# Patient Record
Sex: MALE | Race: BLACK OR AFRICAN AMERICAN | HISPANIC OR LATINO | URBAN - METROPOLITAN AREA
[De-identification: names, ages, dates, MRNs, and addresses within clinical notes are randomized per-mention and may not be internally consistent; named-entity substitution may affect disease eponyms.]

---

## 2017-02-21 ENCOUNTER — EMERGENCY (EMERGENCY)
Facility: HOSPITAL | Age: 27
LOS: 1 days | Discharge: HOME | End: 2017-02-21
Attending: EMERGENCY MEDICINE | Admitting: EMERGENCY MEDICINE
Payer: SELF-PAY

## 2017-02-21 VITALS
RESPIRATION RATE: 16 BRPM | OXYGEN SATURATION: 97 % | TEMPERATURE: 98 F | DIASTOLIC BLOOD PRESSURE: 68 MMHG | SYSTOLIC BLOOD PRESSURE: 110 MMHG | HEART RATE: 64 BPM

## 2017-02-21 DIAGNOSIS — R05 COUGH: ICD-10-CM

## 2017-02-21 PROCEDURE — 99283 EMERGENCY DEPT VISIT LOW MDM: CPT

## 2017-02-21 NOTE — ED PROVIDER NOTE - OBJECTIVE STATEMENT
25 yo male with a history of polysubstance abuse and numerous ED visits for AMS presents by EMS.  States that he was just trying to sleep in front in the [employer] and was brought in.  States "nothing hurts, I just want something to eat and to sleep"  Complex care note was added recently as the patient has been combative with the staff in the past.  Uncooperative with history and physical.

## 2017-02-21 NOTE — ED PROVIDER NOTE - MEDICAL DECISION MAKING DETAILS
MSE performed.  Patient asking to sleep and for food.  Numerous ED visits for AMS/substance abuse in the past.  No vital sign derangements.  Will provide food and discharge from the ED

## 2017-02-22 ENCOUNTER — EMERGENCY (EMERGENCY)
Facility: HOSPITAL | Age: 27
LOS: 1 days | Discharge: HOME | End: 2017-02-22
Attending: EMERGENCY MEDICINE | Admitting: EMERGENCY MEDICINE
Payer: SELF-PAY

## 2017-02-22 VITALS
DIASTOLIC BLOOD PRESSURE: 85 MMHG | SYSTOLIC BLOOD PRESSURE: 110 MMHG | OXYGEN SATURATION: 97 % | RESPIRATION RATE: 16 BRPM | HEART RATE: 80 BPM | TEMPERATURE: 98 F

## 2017-02-22 VITALS
SYSTOLIC BLOOD PRESSURE: 105 MMHG | RESPIRATION RATE: 16 BRPM | OXYGEN SATURATION: 96 % | TEMPERATURE: 98 F | HEART RATE: 84 BPM | DIASTOLIC BLOOD PRESSURE: 89 MMHG

## 2017-02-22 DIAGNOSIS — R41.82 ALTERED MENTAL STATUS, UNSPECIFIED: ICD-10-CM

## 2017-02-22 DIAGNOSIS — F10.129 ALCOHOL ABUSE WITH INTOXICATION, UNSPECIFIED: ICD-10-CM

## 2017-02-22 PROCEDURE — 99283 EMERGENCY DEPT VISIT LOW MDM: CPT

## 2017-02-22 NOTE — ED BEHAVIORAL HEALTH NOTE - BEHAVIORAL HEALTH NOTE
Worker provided resident with information for both inpatient and outpatient detox centers. Also provided patient with information about where to get food, clothes, showers and shelter just in case he needs that current information as he was unwilling to provide to the location to where he stays or if he gets any other medical assistance.

## 2017-02-22 NOTE — ED ADULT NURSE NOTE - CHIEF COMPLAINT QUOTE
Pt found sleeping in front of a [employer]. Pt admits to drinking alcohol.  in field. Pt aggressive in triage. See complex care note.

## 2017-02-22 NOTE — SBIRT NOTE. - NSSBIRTFULLSCREEN_GEN_A_ED_FT
Meeting with patient attempted however Full Screen Not Performed due to  Severity of illness/mental status  RN deferred full screen

## 2017-02-22 NOTE — ED ADULT NURSE NOTE - PMH
ADHD (attention deficit hyperactivity disorder)    No pertinent past medical history    Polysubstance (excluding opioids) dependence    Suicidal ideation

## 2017-02-22 NOTE — ED ADULT TRIAGE NOTE - CHIEF COMPLAINT QUOTE
Pt found sleeping in front of a [employer]. Pt admits to drinking alcohol.  in field. Pt aggressive in triage. See complex care note.

## 2017-02-22 NOTE — ED PROVIDER NOTE - OBJECTIVE STATEMENT
27 y/o male pt, hx of prior visits to ED due to AMS, alcohol intox. Here for AMS. Uncooperative w hx, sleeping while being examined but responds to some commands. Hx of aggressive behavior in ED before

## 2017-03-15 ENCOUNTER — EMERGENCY (EMERGENCY)
Facility: HOSPITAL | Age: 27
LOS: 1 days | Discharge: HOME | End: 2017-03-15
Attending: EMERGENCY MEDICINE | Admitting: EMERGENCY MEDICINE
Payer: MEDICAID

## 2017-03-15 VITALS
TEMPERATURE: 98 F | DIASTOLIC BLOOD PRESSURE: 85 MMHG | RESPIRATION RATE: 18 BRPM | OXYGEN SATURATION: 100 % | HEART RATE: 78 BPM | SYSTOLIC BLOOD PRESSURE: 125 MMHG

## 2017-03-15 DIAGNOSIS — Z00.8 ENCOUNTER FOR OTHER GENERAL EXAMINATION: ICD-10-CM

## 2017-03-15 DIAGNOSIS — R30.0 DYSURIA: ICD-10-CM

## 2017-03-15 PROCEDURE — 99283 EMERGENCY DEPT VISIT LOW MDM: CPT

## 2017-03-15 NOTE — ED PROVIDER NOTE - ATTENDING CONTRIBUTION TO CARE
Pt BIBEMs, hx of polysubstance abuse, no distress. As soon as came to ED went to the bathroom and is requesting to leave. Medical screening exam performed - no need for emergent tx. Discharged

## 2017-03-15 NOTE — ED ADULT NURSE NOTE - OBJECTIVE STATEMENT
Upon arrival pt. requested to use the bathroom, pt. found to be smoking marijuana in hospital bathroom. Pt. states he has no complaints and requesting to go home.

## 2017-03-15 NOTE — ED PROVIDER NOTE - OBJECTIVE STATEMENT
27 y/o M presented to ED via EMS for urinary symptoms per triage note.  Within 10 minutes of triage, pt found in bathroom smoking marijuana by security at which point, he declined exam and states "I just want my backpack before I go."  Pt requesting to leave ED.  Of note, pt has prior visit for alcohol intoxication with aggressive behavior towards staff.

## 2017-03-15 NOTE — ED PROVIDER NOTE - MEDICAL DECISION MAKING DETAILS
25 y/o M presents to ED via EMS.  Shortly after arrival, pt denies complaints and requesting to leave. MSE performed, no acute emergency. VSS.  Pt alert and oriented.  Pt discharged.

## 2017-03-15 NOTE — ED ADULT TRIAGE NOTE - CHIEF COMPLAINT QUOTE
patient refuse to speak at triage, otherwise called ems for painful urination.  Called from [facility]

## 2017-03-17 ENCOUNTER — EMERGENCY (EMERGENCY)
Facility: HOSPITAL | Age: 27
LOS: 1 days | Discharge: HOME | End: 2017-03-17
Attending: EMERGENCY MEDICINE | Admitting: EMERGENCY MEDICINE
Payer: MEDICAID

## 2017-03-17 VITALS
HEART RATE: 62 BPM | OXYGEN SATURATION: 98 % | DIASTOLIC BLOOD PRESSURE: 61 MMHG | SYSTOLIC BLOOD PRESSURE: 116 MMHG | TEMPERATURE: 98 F | RESPIRATION RATE: 16 BRPM

## 2017-03-17 VITALS
RESPIRATION RATE: 18 BRPM | TEMPERATURE: 98 F | DIASTOLIC BLOOD PRESSURE: 89 MMHG | HEART RATE: 76 BPM | OXYGEN SATURATION: 99 % | SYSTOLIC BLOOD PRESSURE: 121 MMHG

## 2017-03-17 DIAGNOSIS — F10.129 ALCOHOL ABUSE WITH INTOXICATION, UNSPECIFIED: ICD-10-CM

## 2017-03-17 DIAGNOSIS — R41.82 ALTERED MENTAL STATUS, UNSPECIFIED: ICD-10-CM

## 2017-03-17 PROCEDURE — 99283 EMERGENCY DEPT VISIT LOW MDM: CPT

## 2017-03-17 NOTE — ED PROVIDER NOTE - NS ED MD SCRIBE ATTENDING SCRIBE SECTIONS
PHYSICAL EXAM/OBSERVATION MONITORING PLAN/HISTORY OF PRESENT ILLNESS/PAST MEDICAL/SURGICAL/SOCIAL HISTORY/REVIEW OF SYSTEMS/DISPOSITION

## 2017-03-17 NOTE — ED PROVIDER NOTE - OBJECTIVE STATEMENT
27 yo M w/ hx of polysubstance dependence and suicidal ideation BIB EMS to ED for ETOH intoxication today, admits to ETOH today, no other complaints, unable to cooperate with remainder of history. No signs of trauma, vomiting, or seizures.

## 2017-03-17 NOTE — ED PROVIDER NOTE - MEDICAL DECISION MAKING DETAILS
The scribe's documentation has been prepared under my direction and personally reviewed by me in its entirety. I confirm that the note above accurately reflects all work, treatment, procedures, and medical decision making performed by me. - BYRON Mauricio Pt intoxicated awaiting sobriety. No signs of trauma, vital signs stable.     The scribe's documentation has been prepared under my direction and personally reviewed by me in its entirety. I confirm that the note above accurately reflects all work, treatment, procedures, and medical decision making performed by me. - BYRON Mauricio

## 2017-04-06 ENCOUNTER — EMERGENCY (EMERGENCY)
Facility: HOSPITAL | Age: 27
LOS: 1 days | Discharge: HOME | End: 2017-04-06
Attending: EMERGENCY MEDICINE | Admitting: EMERGENCY MEDICINE
Payer: MEDICAID

## 2017-04-06 VITALS
HEIGHT: 69 IN | DIASTOLIC BLOOD PRESSURE: 86 MMHG | RESPIRATION RATE: 17 BRPM | WEIGHT: 160.06 LBS | HEART RATE: 76 BPM | SYSTOLIC BLOOD PRESSURE: 132 MMHG | OXYGEN SATURATION: 100 % | TEMPERATURE: 98 F

## 2017-04-06 DIAGNOSIS — R41.82 ALTERED MENTAL STATUS, UNSPECIFIED: ICD-10-CM

## 2017-04-06 DIAGNOSIS — F19.20 OTHER PSYCHOACTIVE SUBSTANCE DEPENDENCE, UNCOMPLICATED: ICD-10-CM

## 2017-04-06 PROCEDURE — 99283 EMERGENCY DEPT VISIT LOW MDM: CPT | Mod: 25

## 2017-04-06 PROCEDURE — 99053 MED SERV 10PM-8AM 24 HR FAC: CPT

## 2017-04-06 NOTE — ED ADULT TRIAGE NOTE - CHIEF COMPLAINT QUOTE
bibems after [employer] found pt. lying in the middle of street, found an empty bottle of gin in his jacket, responds to verbal.

## 2017-04-07 NOTE — ED ADULT NURSE NOTE - OBJECTIVE STATEMENT
pt is a 26y male, bibems, for altered mental status; found by [facility] in the street, with empty bottle of liquor in pocket. pt responsive to verbal stimuli, uncooperative, sleepy. no obvious signs of injury/trauma/deformity.

## 2017-04-07 NOTE — ED PROVIDER NOTE - UNABLE TO OBTAIN
Urgent need for Intervention patient is intoxicated, unable to provide a history or cooperate with physical exam.

## 2017-04-14 ENCOUNTER — EMERGENCY (EMERGENCY)
Facility: HOSPITAL | Age: 27
LOS: 1 days | Discharge: HOME | End: 2017-04-14
Attending: EMERGENCY MEDICINE | Admitting: EMERGENCY MEDICINE
Payer: MEDICAID

## 2017-04-14 VITALS
TEMPERATURE: 98 F | HEART RATE: 83 BPM | RESPIRATION RATE: 18 BRPM | SYSTOLIC BLOOD PRESSURE: 119 MMHG | DIASTOLIC BLOOD PRESSURE: 68 MMHG | OXYGEN SATURATION: 97 %

## 2017-04-14 DIAGNOSIS — M79.672 PAIN IN LEFT FOOT: ICD-10-CM

## 2017-04-14 DIAGNOSIS — Z59.0 HOMELESSNESS: ICD-10-CM

## 2017-04-14 PROCEDURE — 99283 EMERGENCY DEPT VISIT LOW MDM: CPT

## 2017-04-14 SDOH — ECONOMIC STABILITY - HOUSING INSECURITY: HOMELESSNESS: Z59.0

## 2017-04-14 NOTE — ED PROVIDER NOTE - OBJECTIVE STATEMENT
26 y.o male with hx of ADHD, asthma, and polysubstance abuse presents to the ED c/o left foot pain and states he walks a lot because he is homeless. Also states he feel down the stairs and landed onto his knee 3 weeks ago. Denies difficulty walking, numbness, and tingling. Pt is requesting for medication and food.

## 2017-04-14 NOTE — ED PROVIDER NOTE - MEDICAL DECISION MAKING DETAILS
Patient denies any medical complaint.  Refused examination.  Registered for food and drink as he is homeless

## 2017-04-27 ENCOUNTER — EMERGENCY (EMERGENCY)
Facility: HOSPITAL | Age: 27
LOS: 1 days | Discharge: HOME | End: 2017-04-27
Attending: EMERGENCY MEDICINE | Admitting: EMERGENCY MEDICINE
Payer: MEDICAID

## 2017-04-27 VITALS
OXYGEN SATURATION: 99 % | RESPIRATION RATE: 16 BRPM | SYSTOLIC BLOOD PRESSURE: 119 MMHG | DIASTOLIC BLOOD PRESSURE: 76 MMHG | TEMPERATURE: 97 F | HEART RATE: 80 BPM

## 2017-04-27 VITALS
HEART RATE: 72 BPM | OXYGEN SATURATION: 100 % | TEMPERATURE: 97 F | SYSTOLIC BLOOD PRESSURE: 112 MMHG | RESPIRATION RATE: 16 BRPM | DIASTOLIC BLOOD PRESSURE: 68 MMHG

## 2017-04-27 DIAGNOSIS — F12.20 CANNABIS DEPENDENCE, UNCOMPLICATED: ICD-10-CM

## 2017-04-27 DIAGNOSIS — R41.82 ALTERED MENTAL STATUS, UNSPECIFIED: ICD-10-CM

## 2017-04-27 LAB — PCP SPEC-MCNC: SIGNIFICANT CHANGE UP

## 2017-04-27 PROCEDURE — 99283 EMERGENCY DEPT VISIT LOW MDM: CPT

## 2017-04-27 NOTE — ED PROVIDER NOTE - OBJECTIVE STATEMENT
25 yo male with history of polysubstance abuse BIBA with numerous ED visits presents to ED with AMS. As per triage, patient admits to smoking K2. As per PCT, pt was verbal upon arrival.  Minimally cooperative with hx and ROS. No signs of trauma, no medical complaints noted.

## 2017-04-27 NOTE — ED ADULT NURSE REASSESSMENT NOTE - NS ED NURSE REASSESS COMMENT FT1
security called to escort patient out, he is verbally aggressive and becoming physically aggressive, police called as well

## 2017-04-27 NOTE — ED PROVIDER NOTE - PROGRESS NOTE DETAILS
DUS neg.  Patient now ambulatory with steady gait and clear speech.  Observed in the ED until clinical sobriety.  Safe discharge planning discussed with patient.  Alcohol cessation discussed with patient.

## 2017-04-27 NOTE — ED PROVIDER NOTE - NS ED MD SCRIBE ATTENDING SCRIBE SECTIONS
INTAKE ASSESSMENT/SCREENINGS/HISTORY OF PRESENT ILLNESS/CONSULTATIONS/SHIFT CHANGE/PROGRESS NOTE/RESULTS/REVIEW OF SYSTEMS/HIV/DISPOSITION/PHYSICAL EXAM/PAST MEDICAL/SURGICAL/SOCIAL HISTORY/VITAL SIGNS( Pullset)

## 2017-04-27 NOTE — ED PROVIDER NOTE - MEDICAL DECISION MAKING DETAILS
Patient BIBEMS for smoking K2. Previous visits to this ED for substance abuse. History and ROS limited due to altered state.  No evidence of head or extremity trauma.  No vital sign derangements.  Abdomen not distended.  Observe to clinical sobriety.

## 2019-10-31 NOTE — ED PROVIDER NOTE - NS ED MD SCRIBE ATTENDING SCRIBE SECTIONS
no dry mouth/no throat pain/no dysphagia/no ear pain/no nasal congestion/no gum bleeding/no hearing difficulty/no vertigo/no tinnitus VITAL SIGNS( Pullset)/PROGRESS NOTE/PHYSICAL EXAM/PAST MEDICAL/SURGICAL/SOCIAL HISTORY/DISPOSITION/HIV/REVIEW OF SYSTEMS/RESULTS/HISTORY OF PRESENT ILLNESS

## 2022-06-14 NOTE — ED ADULT NURSE NOTE - CHIEF COMPLAINT QUOTE
patient refuse to speak at triage, otherwise called ems for painful urination.  Called from [facility] no

## 2024-12-31 NOTE — ED ADULT NURSE NOTE - NS ED NURSE LEVEL OF CONSCIOUSNESS ORIENTATION
Pt called in regard to a lab appointment. Informed pt there is not a lab appointment scheduled.  
Oriented - self; Oriented - place; Oriented - time